# Patient Record
Sex: FEMALE | Race: WHITE
[De-identification: names, ages, dates, MRNs, and addresses within clinical notes are randomized per-mention and may not be internally consistent; named-entity substitution may affect disease eponyms.]

---

## 2019-02-20 ENCOUNTER — HOSPITAL ENCOUNTER (EMERGENCY)
Dept: HOSPITAL 91 - FTE | Age: 24
Discharge: HOME | End: 2019-02-20
Payer: COMMERCIAL

## 2019-02-20 ENCOUNTER — HOSPITAL ENCOUNTER (EMERGENCY)
Dept: HOSPITAL 10 - FTE | Age: 24
Discharge: HOME | End: 2019-02-20
Payer: COMMERCIAL

## 2019-02-20 VITALS
WEIGHT: 216.05 LBS | BODY MASS INDEX: 38.28 KG/M2 | HEIGHT: 63 IN | BODY MASS INDEX: 38.28 KG/M2 | WEIGHT: 216.05 LBS | HEIGHT: 63 IN

## 2019-02-20 VITALS — SYSTOLIC BLOOD PRESSURE: 144 MMHG | HEART RATE: 116 BPM | RESPIRATION RATE: 18 BRPM | DIASTOLIC BLOOD PRESSURE: 81 MMHG

## 2019-02-20 DIAGNOSIS — S99.912A: Primary | ICD-10-CM
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
## 2019-02-20 PROCEDURE — 73610 X-RAY EXAM OF ANKLE: CPT

## 2019-02-20 PROCEDURE — 99283 EMERGENCY DEPT VISIT LOW MDM: CPT

## 2019-02-20 RX ADMIN — ACETAMINOPHEN 1 MG: 325 TABLET, FILM COATED ORAL at 14:55

## 2019-02-20 NOTE — ERD
ER Documentation


Chief Complaint


Chief Complaint





LEFT ANKLE PAIN X 2 HRS





HPI


24-year-old female rolled her left ankle while walking today.  She has a history


of previous screw fixation from previous fracture although over 10 years ago.  


She has no restricted range of motion, weakness, bleeding or lacerations.  Kianna


s other injury other other than her left ankle.





ROS


All systems reviewed and are negative except as per history of present illness.





Medications


Home Meds


Active Scripts


Ibuprofen* (Motrin*) 600 Mg Tab, 600 MG PO Q6, #20 TAB


   Prov:DIMAS OWENS MD         2/20/19


Reported Medications


[None]   No Conflict Check


   6/10/12





Allergies


Allergies:  


Coded Allergies:  


     egg (Verified  Allergy, Unknown, 2/20/19)





PMhx/Soc


History of Surgery:  Yes (left ankle )


Anesthesia Reaction:  No


Hx Neurological Disorder:  No


Hx Respiratory Disorders:  No


Hx Cardiac Disorders:  No


Hx Psychiatric Problems:  No


Hx Miscellaneous Medical Probl:  No


Hx Alcohol Use:  No


Hx Substance Use:  No


Hx Tobacco Use:  No


Smoking Status:  Never smoker





FmHx


Family History:  No diabetes, No coronary disease, No other





Physical Exam


Vitals





Vital Signs


  Date      Temp  Pulse  Resp  B/P (MAP)   Pulse Ox  O2          O2 Flow    FiO2


Time                                                 Delivery    Rate


   2/20/19  98.1    116    18      144/81       100


     14:12                          (102)





Physical Exam


Const:   No acute distress


Head:   Atraumatic 


Eyes:    Normal Conjunctiva


ENT:    Normal External Ears, Nose and Mouth.


Neck:               Full range of motion. No meningismus.


Resp:   Clear to auscultation bilaterally


Cardio:   Regular rate and rhythm, no murmurs


Abd:    Soft, non tender, non distended. Normal bowel sounds


Skin:   No petechiae or rashes


Back:   No midline or flank tenderness


Ext:    No cyanosis, or edema


Neur:   Awake and alert


Psych:    Normal Mood and Affect


Results 24 hrs





Current Medications


 Medications
   Dose
          Sig/Dustin
       Start Time
   Status  Last


 (Trade)       Ordered        Route
 PRN     Stop Time              Admin
Dose


                              Reason                                Admin


                650 mg         ONCE  ONCE
    2/20/19       DC           2/20/19


Acetaminophen                 PO
            14:30
                       14:55




  (Tylenol                                  2/20/19 14:31


Tab)








Procedures/MDM


X-ray left ankle 3V Interpreted by me: 


Bones:    No fracture


Joints:       No dislocation


Foreign Body:    None.  Impression-normal left ankle.  Screw fixation in place 


without hardware breakdown.





Patient is placed in a left ankle Ace bandage.  Patient is neurovascular intact 


after Ace bandage.  Patient presents with signs and symptoms of left ankle 


sprain no signs of fracture, dislocation, ischemia infection or deficits.  She 


will be discharged home with instructions for ice, elevation, primary care 


follow-up and ibuprofen for pain.  The patient was stable with no new complaints


 during the ER course. Clinically, there is no current evidence to suggest 


meningitis, sepsis, acute abdomen, pneumonia, stroke,  acute coronary syndrome, 


pulmonary embolism, aortic dissection or any other emergent condition appearing 


to require further evaluation or hospitalization.  Patient counseled regarding 


my diagnostic impression and care plan. Prior to discharge all questions 


answered. Pt agrees with treatment plan and understands strict return 


precautions. Pt is instructed to follow up with primary care provider within 24-


48 hours. Precautionary instructions provided including instructions to return 


to the ER if not improving or for any worsening or changing symptoms or 


concerns.





Departure


Diagnosis:  


   Primary Impression:  


   Ankle injury


   Encounter type:  initial encounter  Laterality:  left  Qualified Codes:  


   S99.912A - Unspecified injury of left ankle, initial encounter


Condition:  Stable


Patient Instructions:  Treating Ankle Sprains





Additional Instructions:  


X-ray read as normal.  Recheck for new or worsening symptoms with primary care 


doctor.  Ice and elevate at home.











DIMAS OWENS MD             Feb 20, 2019 15:14

## 2019-07-27 ENCOUNTER — HOSPITAL ENCOUNTER (EMERGENCY)
Dept: HOSPITAL 10 - FTE | Age: 24
Discharge: HOME | End: 2019-07-27
Payer: COMMERCIAL

## 2019-07-27 ENCOUNTER — HOSPITAL ENCOUNTER (EMERGENCY)
Dept: HOSPITAL 91 - FTE | Age: 24
Discharge: HOME | End: 2019-07-27
Payer: COMMERCIAL

## 2019-07-27 VITALS
HEIGHT: 65 IN | HEIGHT: 65 IN | BODY MASS INDEX: 33.54 KG/M2 | WEIGHT: 201.28 LBS | BODY MASS INDEX: 33.54 KG/M2 | WEIGHT: 201.28 LBS

## 2019-07-27 VITALS — DIASTOLIC BLOOD PRESSURE: 85 MMHG | RESPIRATION RATE: 18 BRPM | SYSTOLIC BLOOD PRESSURE: 140 MMHG | HEART RATE: 105 BPM

## 2019-07-27 DIAGNOSIS — N30.01: Primary | ICD-10-CM

## 2019-07-27 DIAGNOSIS — B37.3: ICD-10-CM

## 2019-07-27 LAB
ADD UMIC: YES
UR ASCORBIC ACID: NEGATIVE MG/DL
UR BACTERIA: (no result) /HPF
UR BILIRUBIN (DIP): NEGATIVE MG/DL
UR BLOOD (DIP): (no result) MG/DL
UR CLARITY: (no result)
UR COLOR: YELLOW
UR GLUCOSE (DIP): NEGATIVE MG/DL
UR KETONES (DIP): (no result) MG/DL
UR LEUKOCYTE ESTERASE (DIP): (no result) LEU/UL
UR MUCUS: (no result) /HPF
UR NITRITE (DIP): NEGATIVE MG/DL
UR PH (DIP): 7 (ref 5–9)
UR RBC: 14 /HPF (ref 0–5)
UR SPECIFIC GRAVITY (DIP): 1 (ref 1–1.03)
UR SQUAMOUS EPITHELIAL CELL: (no result) /HPF
UR TOTAL PROTEIN (DIP): (no result) MG/DL
UR UROBILINOGEN (DIP): NEGATIVE MG/DL
UR WBC: 163 /HPF (ref 0–5)

## 2019-07-27 PROCEDURE — 81001 URINALYSIS AUTO W/SCOPE: CPT

## 2019-07-27 PROCEDURE — 81025 URINE PREGNANCY TEST: CPT

## 2019-07-27 PROCEDURE — 87220 TISSUE EXAM FOR FUNGI: CPT

## 2019-07-27 PROCEDURE — 87086 URINE CULTURE/COLONY COUNT: CPT

## 2019-07-27 PROCEDURE — 99284 EMERGENCY DEPT VISIT MOD MDM: CPT

## 2019-07-27 NOTE — ERD
ER Documentation


Chief Complaint


Chief Complaint





dysuria x 1 week





HPI


This is a 24-year-old sexually active female presents to the ED complaining of 


vaginal discharge and dysuria x5 days.  She has been using topical antifungal 


creams and drinking cranberry pills with some temporary relief.  She still 


states that she feels "uncomfortable" when urinating.  She reports thick vaginal


discharge.  No odor.  She is sexually active with protection, no exposures to 


STDs.  Her last menstrual cycle was on 2019.  Denies any abdominal pain,


pelvic pain, nausea, vomiting, fevers, flank/back pain.  No history of similar 


symptoms.





ROS


All systems reviewed and are negative except as per history of present illness.





Medications


Home Meds


Active Scripts


Fluconazole* (Diflucan*) 150 Mg Tablet, 150 MG PO ONCE, #2 TAB


   Prov:RENUKAIGRRADHAANGILMER-C         19


Cephalexin* (Keflex*) 500 Mg Capsule, 500 MG PO BID for 7 Days, CAP


   Prov:RENUKAIGRIKIANGILMER-C         19


Ibuprofen* (Motrin*) 600 Mg Tab, 600 MG PO Q6, #20 TAB


   Prov:DIMAS OWENS MD         19


Reported Medications


[None]   No Conflict Check


   6/10/12





Allergies


Allergies:  


Coded Allergies:  


     egg (Verified  Allergy, Unknown, 19)





PMhx/Soc


History of Surgery:  Yes (left ankle )


Anesthesia Reaction:  No


Hx Neurological Disorder:  No


Hx Respiratory Disorders:  No


Hx Cardiac Disorders:  No


Hx Psychiatric Problems:  No


Hx Miscellaneous Medical Probl:  No


Hx Alcohol Use:  No


Hx Substance Use:  No


Hx Tobacco Use:  No





Physical Exam


Vitals





Vital Signs


  Date      Temp  Pulse  Resp  B/P (MAP)   Pulse Ox  O2          O2 Flow    FiO2


Time                                                 Delivery    Rate


   19  97.6    105    18      140/85        99


     15:27                          (103)





Physical Exam


Const:   No acute distress


Head:   Atraumatic 


Eyes:    Normal Conjunctiva


ENT:    Normal External Ears, Nose and Mouth.


Neck:               Full range of motion. No meningismus.


Resp:   Clear to auscultation bilaterally


Cardio:   Regular rate and rhythm, no murmurs


Abd:    Soft, non tender, non distended. Normal bowel sounds


 Pelvic Exam:


 Abdomen:      Nontender


 External Genitalia:   Normal Skin


 Speculum:      Normal vaginal mucosa, + thick white vaginal discharge


 Bimanual:       No adnexal masses or tenderness, No CMT


Skin:   No petechiae or rashes


Back:   No midline or flank tenderness


Ext:    No cyanosis, or edema


Neur:   Awake and alert


Psych:    Normal Mood and Affect


Results 24 hrs





Laboratory Tests


        Test
                              19
16:42  19
17:02


        Urine Color                      YELLOW


        Urine Clarity
                   SLIGHTLY
CLOUDY  



        Urine pH                                    7.0


        Urine Specific Gravity                    1.005


        Urine Ketones                    TRACE mg/dL


        Urine Nitrite                    NEGATIVE mg/dL


        Urine Bilirubin                  NEGATIVE mg/dL


        Urine Urobilinogen               NEGATIVE mg/dL


        Urine Leukocyte Esterase              3+ Hay/ul


        Urine Microscopic RBC                   14 /HPF


        Urine Microscopic WBC                  163 /HPF


        Urine Squamous Epithelial
Cells  FEW /HPF 
       



        Urine Bacteria                   FEW /HPF


        Urine Mucus                      FEW /HPF


        Urine Hemoglobin                       3+ mg/dL


        Urine Glucose                    NEGATIVE mg/dL


        Urine Total Protein                    1+ mg/dl


        POC Beta HCG, Qualitative                         NEGATIVE








Procedures/MDM


LABS & DIAGNOSTIC IMAGING:


Urine:      3+ leuk esterase with hematuria and pyuria


hcg:      neg


Wet mount:      + yeast 











MEDICAL DECISION MAKIN-year-old sexually active female presents with vaginal discharge and urinary 


symptoms.  UA is consistent with acute cystitis and wet mount positive for 


vulvovaginal candidiasis. Vital signs are normal and abdominal exam is benign. 


Pt is stable and be treated w/ outpatient antibiotics and fluconazole.  History 


and physical not concerning for pyelonephritis, PID, or any other emergent 


pathology.  She was given referrals to [facility] for age-related cancer


 screening and Pap smears.  Strict return precautions were discussed.








PRESCRIPTIONS: Fluconazole, Keflex








SPECIALIST FOLLOW UP RECOMMENDED: None


Patient has been advised to follow up with primary care in 1-2 days.





Departure


Diagnosis:  


   Primary Impression:  


   UTI (urinary tract infection)


   Urinary tract infection type:  acute cystitis  Hematuria presence:  with 


   hematuria  Qualified Codes:  N30.01 - Acute cystitis with hematuria


   Additional Impression:  


   Vulvovaginal candidiasis











GILMER NAVARRO PA-C     2019 17:03